# Patient Record
Sex: FEMALE | Race: WHITE | NOT HISPANIC OR LATINO | Employment: STUDENT | URBAN - NONMETROPOLITAN AREA
[De-identification: names, ages, dates, MRNs, and addresses within clinical notes are randomized per-mention and may not be internally consistent; named-entity substitution may affect disease eponyms.]

---
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
## 2022-05-25 ENCOUNTER — OFFICE VISIT (OUTPATIENT)
Dept: URGENT CARE | Facility: CLINIC | Age: 22
End: 2022-05-25
Payer: COMMERCIAL

## 2022-05-25 VITALS
HEART RATE: 80 BPM | SYSTOLIC BLOOD PRESSURE: 112 MMHG | DIASTOLIC BLOOD PRESSURE: 73 MMHG | TEMPERATURE: 98.4 F | OXYGEN SATURATION: 96 % | RESPIRATION RATE: 20 BRPM

## 2022-05-25 DIAGNOSIS — U07.1 COVID-19: Primary | ICD-10-CM

## 2022-05-25 LAB
SARS-COV-2 AG UPPER RESP QL IA: POSITIVE
VALID CONTROL: ABNORMAL

## 2022-05-25 PROCEDURE — 87811 SARS-COV-2 COVID19 W/OPTIC: CPT

## 2022-05-25 PROCEDURE — 99203 OFFICE O/P NEW LOW 30 MIN: CPT

## 2022-05-25 PROCEDURE — S9088 SERVICES PROVIDED IN URGENT: HCPCS

## 2022-05-25 RX ORDER — FLUTICASONE PROPIONATE 50 MCG
1 SPRAY, SUSPENSION (ML) NASAL DAILY
COMMUNITY

## 2022-05-25 NOTE — PATIENT INSTRUCTIONS
Rapid COVID test was positive  Please quarantine for 5 days from the onset of symptoms and upon the 6th day you may return to work a soft during a mask for the next 5 days  Current recommendations for treatment are 1 g of vitamin-C twice a day, 2000 International Units of vitamin D3 once a day, and multivitamin  Call your primary care provider to inform them of the positive COVID result and for follow-up  Currently there is a medication used to treat COVID for patient sore at risk of developing severe COVID you may discuss this with your primary care provider to see if you are eligible  Clean high touch surfaces after use including the bathroom  Practice proper cough etiquette and good hand hygiene  Dispose of used tissues immediately  May use an over-the-counter saline nasal spray, Mucinex as directed on the bottles for congestion as needed  May use hot showers to steam up bathroom and enter into the steaming room to help with congestion  May use Vicks in humidifier, or vapor rub, or drops in shower or tub to help with congestion  May use a cool mist or warm mist humidifier to help thin out secretions  May continue taking over-the-counter Tylenol and ibuprofen as directed on the bottle as needed for fever body aches  Ensure you are drinking plenty of fluids  Honey is a natural cough suppressant and will help soothe the sore throat  Honey should not be given to pregnant women or children under the age of 3years old  May also use over-the-counter cough drops or Cepacol as needed for sore throat  Follow with your PCP in 3-5 days  If symptoms worsen proceed to the ED

## 2022-05-25 NOTE — PROGRESS NOTES
[facility]        NAME: Tasneem Pineda is a 24 y o  female  : 2000    MRN: [id number]  DATE: May 25, 2022  TIME: 4:55 PM    Assessment and Plan   COVID-19 [U07 1]  1  COVID-19  Poct Covid 19 Rapid Antigen Test     Rapid COVID-19 was positive  Treat symptomatically  Patient educated on possible options and to call PCP  Patient Instructions   Rapid COVID test was positive  Please quarantine for 5 days from the onset of symptoms and upon the 6th day you may return to work a soft during a mask for the next 5 days  Current recommendations for treatment are 1 g of vitamin-C twice a day, 2000 International Units of vitamin D3 once a day, and multivitamin  Call your primary care provider to inform them of the positive COVID result and for follow-up  Currently there is a medication used to treat COVID for patient sore at risk of developing severe COVID you may discuss this with your primary care provider to see if you are eligible  Clean high touch surfaces after use including the bathroom  Practice proper cough etiquette and good hand hygiene  Dispose of used tissues immediately  May use an over-the-counter saline nasal spray, Mucinex as directed on the bottles for congestion as needed  May use hot showers to steam up bathroom and enter into the steaming room to help with congestion  May use Vicks in humidifier, or vapor rub, or drops in shower or tub to help with congestion  May use a cool mist or warm mist humidifier to help thin out secretions  May continue taking over-the-counter Tylenol and ibuprofen as directed on the bottle as needed for fever body aches  Ensure you are drinking plenty of fluids  Honey is a natural cough suppressant and will help soothe the sore throat  Honey should not be given to pregnant women or children under the age of 3years old  May also use over-the-counter cough drops or Cepacol as needed for sore throat  Follow with your PCP in 3-5 days  If symptoms worsen proceed to the ED  Follow up with PCP in 3-5 days  Proceed to  ER if symptoms worsen  Chief Complaint     Chief Complaint   Patient presents with    Cold Like Symptoms     Slight sore throat, fatigue, congestion since Saturday  Exposed to COVID         History of Present Illness       Patient is a 24year old female who presents to the office today for exposure to COVID-19  States that her roommate starting having symptoms on Friday night and related it to being in a smoke filled bar  States roommate tested positive for COVID-19 this morning  States that on Saturday night into Sunday patient started with congestion that got worse today  Also complains of fatigue, occasional cough-productive for light green/clear mucous, sneezing  Does suffer from seasonal allergies and believed it was that  Does currently smoke electronic cigarettes and medical marijuana  Denies nausea, vomiting, diarrhea,chest pain, sob  Review of Systems   Review of Systems   Constitutional: Negative for activity change, appetite change, chills, fatigue and fever  HENT: Positive for congestion, rhinorrhea, sinus pressure and sneezing  Respiratory: Positive for cough  Negative for shortness of breath and wheezing  Cardiovascular: Negative for chest pain and palpitations  Gastrointestinal: Negative for diarrhea, nausea and vomiting  Musculoskeletal: Negative for myalgias  Neurological: Negative for headaches  All other systems reviewed and are negative          Current Medications       Current Outpatient Medications:     Ascorbic Acid (VITAMIN C PO), Take by mouth, Disp: , Rfl:     Cetirizine HCl (ZYRTEC PO), Take by mouth, Disp: , Rfl:     fluticasone (FLONASE) 50 mcg/act nasal spray, 1 spray into each nostril daily, Disp: , Rfl:     IRON CR PO, Take by mouth, Disp: , Rfl:     Current Allergies     Allergies as of 05/25/2022    (No Known Allergies)            The following portions of the patient's history were reviewed and updated as appropriate: allergies, current medications, past family history, past medical history, past social history, past surgical history and problem list      Past Medical History:   Diagnosis Date    Foot pain, right     Wears contact lenses     Wears glasses        Past Surgical History:   Procedure Laterality Date    FOOT HARDWARE REMOVAL Right 12/8/2016    Procedure: 1ST METATARSAL HARDWARE REMOVAL; FUSION OF 1ST METATARSAL CUNEIFORM JOINT USE OF SCREWS AND PLATES ;  Surgeon: Inocencia Oconnor DPM;  Location: [facility];  Service:     NC CORRJ HALLUX VALGUS W/ANGELICAMDC W/1METAR MEDIAL CNF Right 12/8/2016    Procedure: REVISIONAL CORRECTION BUNION DEFORMITY;  Surgeon: Inocencia Oconnor DPM;  Location: [facility];  Service: Podiatry    NC [place] W/ANGELICAMDC W/DIST METAR OSTEOT Right 6/30/2016    Procedure: BUNION DEFORMITY REPAIR WITH BONE CUTS FIRST METATARSAL ,POSSIBLE GREAT TOE WITH PLATES,SCREWS, BONE GRAFT ;  Surgeon: Inocencia Oconnor DPM;  [facility];  Service: Podiatry    NC REMOVAL DEEP IMPLANT Right 4/27/2017    Procedure: REMOVAL OF PLATES AND SCREWS OF FOOT, STRESS IMAGING OF TARSOMETATARSAL JOINT WITH POSSIBLE PLACEMENT OF SCREWS, METALLIC OR ABSORBABLE OR SUTURE DEVICE AS NEEDED;  Surgeon: Inocencia Oconnor DPM;  Location[facility];  Service: Podiatry    TONSILECTOMY AND ADNOIDECTOMY      WISDOM TOOTH EXTRACTION         History reviewed  No pertinent family history  Medications have been verified  Objective   /73   Pulse 80   Temp 98 4 °F (36 9 °C)   Resp 20   SpO2 96%        Physical Exam     Physical Exam  Vitals and nursing note reviewed  Constitutional:       General: She is not in acute distress  Appearance: Normal appearance  She is normal weight  She is not ill-appearing or toxic-appearing     HENT:      Right Ear: Hearing, tympanic membrane, ear canal and external ear normal       Left Ear: Hearing, tympanic membrane, ear canal and external ear normal       Nose: Congestion present  Right Turbinates: Enlarged and swollen  Left Turbinates: Enlarged and swollen  Right Sinus: No maxillary sinus tenderness or frontal sinus tenderness  Left Sinus: No maxillary sinus tenderness or frontal sinus tenderness  Mouth/Throat:      Pharynx: Oropharynx is clear  Uvula midline  Cardiovascular:      Rate and Rhythm: Normal rate and regular rhythm  Pulses: Normal pulses  Heart sounds: Normal heart sounds  Pulmonary:      Effort: Pulmonary effort is normal  No respiratory distress  Breath sounds: Normal breath sounds  No stridor  No wheezing, rhonchi or rales  Chest:      Chest wall: No tenderness  Lymphadenopathy:      Cervical: No cervical adenopathy  Skin:     General: Skin is warm and dry  Capillary Refill: Capillary refill takes less than 2 seconds  Neurological:      General: No focal deficit present  Mental Status: She is alert

## 2022-05-25 NOTE — LETTER
[address] [address]  Dept: [phone]    May 25, 2022    Patient: Turner Mota  YOB: 2000    Turner Mota was seen and evaluated at our [facility]  If Covid or Flu test is positive, they may return to work on 5/28/2022, as this is 5 days from the onset of symptoms  Upon return, they must then adhere to strict masking for an additional 5 days      Sincerely,    [employer]

## 2022-08-11 ENCOUNTER — OFFICE VISIT (OUTPATIENT)
Dept: DENTISTRY | Facility: CLINIC | Age: 22
End: 2022-08-11

## 2022-08-11 VITALS — TEMPERATURE: 97.4 F | SYSTOLIC BLOOD PRESSURE: 112 MMHG | DIASTOLIC BLOOD PRESSURE: 72 MMHG

## 2022-08-11 DIAGNOSIS — K02.9 DENTAL CARIES: Primary | ICD-10-CM

## 2022-08-11 DIAGNOSIS — Z01.20 ENCOUNTER FOR DENTAL EXAMINATION: ICD-10-CM

## 2022-08-11 PROCEDURE — D0150 COMPREHENSIVE ORAL EVALUATION - NEW OR ESTABLISHED PATIENT: HCPCS | Performed by: DENTIST

## 2022-08-11 PROCEDURE — D0210 INTRAORAL - COMPLETE SERIES OF RADIOGRAPHIC IMAGES: HCPCS

## 2022-08-11 NOTE — PROGRESS NOTES
Reason for visit:Comprehensive Exam  Rooming Includes:  Dental Vitals recorded  Allergies Reviewed  Medication Reviewed  Dental Health Compliance: Twice daily brushing, never flossing, use of fluoride toothpaste  Medical History Reviewed  ASA 1 - Normal health patient  BP:112/72  Patient has complaints of intermittent pain upper left  Patient presents for exam appointment  Frankl + +  Treatment provided includes comprehensive exam performed by Dr Socorro Neville and full mouth series of xrays to evaluate overall health and decay  Full mouth perio charting recorded  Intraoral exam/Oral Cancer Screening presents with no significant findings  Next visit:prophy, restorative and referral to general dentist  *Triplicate form indicated today's procedures and future visits needed  First page is on file in media center and 3rd page was sent home with patient

## 2022-08-17 ENCOUNTER — OFFICE VISIT (OUTPATIENT)
Dept: DENTISTRY | Facility: CLINIC | Age: 22
End: 2022-08-17

## 2022-08-17 VITALS — SYSTOLIC BLOOD PRESSURE: 110 MMHG | DIASTOLIC BLOOD PRESSURE: 70 MMHG | TEMPERATURE: 98.6 F

## 2022-08-17 DIAGNOSIS — K02.9 DENTAL CARIES: Primary | ICD-10-CM

## 2022-08-17 PROCEDURE — D2391 RESIN-BASED COMPOSITE - 1 SURFACE, POSTERIOR: HCPCS | Performed by: DENTIST

## 2022-08-17 NOTE — PROGRESS NOTES
MUD consent form verified  No changes to medical history per MUD form  Pt is ASA 1 Patient reports pain level of 3   UL #14 was hurting but not at the moment  Patient denies any constitutional symptoms  Patient presents for restorative treatment # 18-O   EOE WNL  IOE shows no swelling or sinus tracts  Anesthesia: 20% benzocaine topical + 1 0 carpule of lidocaine 2% with 1:100k epi via IANB   Isolation: cotton roll  Tx:  Caries excavation of tooth #18-O  Decay deep, placed lime lite, Etch for 12 seconds with 37% phosphoric acid and rinsed, Ivoclar Adhese Universal bond placed with VivaPen 20 second scrub, air dried for 5 seconds and light cured and restored with Tetric composite  Polished restoration  Verified  occlusion  Patient satisfied and dismissed alert and ambulatory  Reviewed post-op anesthesia precautions with patient          NV: Restorative , clinic #[id number]

## 2022-08-18 ENCOUNTER — OFFICE VISIT (OUTPATIENT)
Dept: DENTISTRY | Facility: CLINIC | Age: 22
End: 2022-08-18

## 2022-08-18 VITALS — TEMPERATURE: 98 F | SYSTOLIC BLOOD PRESSURE: 120 MMHG | DIASTOLIC BLOOD PRESSURE: 68 MMHG

## 2022-08-18 DIAGNOSIS — K02.9 DENTAL CARIES: Primary | ICD-10-CM

## 2022-08-18 PROCEDURE — D2391 RESIN-BASED COMPOSITE - 1 SURFACE, POSTERIOR: HCPCS | Performed by: DENTIST

## 2022-08-18 PROCEDURE — D2392 RESIN-BASED COMPOSITE - 2 SURFACES, POSTERIOR: HCPCS | Performed by: DENTIST

## 2022-08-18 NOTE — PROGRESS NOTES
MUD consent form verified  No changes to medical history per MUD form  Pt is ASA 1  Patient reports pain level of 0  Patient denies any constitutional symptoms  Patient presents for restorative treatment # 30-OB,  31-O EOE WNL  IOE shows no swelling or sinus tracts  Anesthesia: 20% benzocaine topical +2 0 carpule of 2% lidocaine with 1:100k epi via IANB  Isolation: cotton rolls  Tx:  Caries excavation of tooth 30,31 Etch for 12 seconds with 37% phosphoric acid and rinsed, Ivoclar Adhese Universal bond placed with Madeira TherapeuticsaPen 20 second scrub, air dried for 5 seconds and light cured and restored with Tetric composite    Polished restoration  Verified occlusion  Patient satisfied and dismissed alert and ambulatory  Reviewed post-op anesthesia precautions with patient       NV: Restorative #3  Refer clinic RCT #[id number]

## 2022-08-19 ENCOUNTER — OFFICE VISIT (OUTPATIENT)
Dept: DENTISTRY | Facility: CLINIC | Age: 22
End: 2022-08-19

## 2022-08-19 VITALS — DIASTOLIC BLOOD PRESSURE: 68 MMHG | TEMPERATURE: 97.2 F | SYSTOLIC BLOOD PRESSURE: 118 MMHG

## 2022-08-19 DIAGNOSIS — Z29.8 ENCOUNTER FOR OTHER SPECIFIED PROPHYLACTIC MEASURES: Primary | ICD-10-CM

## 2022-08-19 DIAGNOSIS — K02.9 DENTAL CARIES: Primary | ICD-10-CM

## 2022-08-19 PROCEDURE — D1110 PROPHYLAXIS - ADULT: HCPCS

## 2022-08-19 PROCEDURE — D2392 RESIN-BASED COMPOSITE - 2 SURFACES, POSTERIOR: HCPCS | Performed by: DENTIST

## 2022-08-19 PROCEDURE — D1330 ORAL HYGIENE INSTRUCTIONS: HCPCS

## 2022-08-19 NOTE — PROGRESS NOTES
MUD consent form verified  No changes to medical history per MUD form  Pt is ASA 1  Patient reports pain level of 0  Patient denies any constitutional symptoms  Patient presents for restorative treatment # 3- OL   EOE WNL  IOE shows no swelling or sinus tracts  Anesthesia: 20% benzocaine topical + 1 0 carpule of 4% articaine with 1:100k epi via buccal infiltration  Isolation: cotton rolls  Tx:  Caries excavation of tooth  #3- decay deep in occlusal pits   Etch for 12 seconds with 37% phosphoric acid and rinsed, Ivoclar Adhese Universal bond placed with [email or web address] 20 second scrub, air dried for 5 seconds and light cured and restored with Tetric composite  Polished restoration  Verified  occlusion  Patient satisfied and dismissed alert and ambulatory    Reviewed post-op anesthesia precautions with patient      NV: refer clinic #14 RCT

## 2022-08-19 NOTE — PATIENT INSTRUCTIONS
Oral hygiene instructions include brushing 2x daily and flossing daily  Reviewed brushing along gumline

## 2022-08-19 NOTE — PROGRESS NOTES
Reason for visit:Routine Prophylaxis  Rooming Includes:  Dental Vitals recorded  Allergies Reviewed  Medication Reviewed  Dental Health Compliance: Twice daily brushing, never flossing, use of fluoride toothpaste  Medical History Reviewed  ASA 1 - Normal health patient    Patient has no complaints/no pain  Patient presents for hygiene appointment  Frankl + +  Treatment provided includes  adult prophy, handscale, polish(mint paste), floss,no routine xrays needed at this visit, oral hygiene instructions and nutritional counseling  Intraoral exam/Oral Cancer Screening presents with no significant findings  Plaque buildup is generalized Light  Calculus buildup is Localized  Light maxillary posterior and mandibular anterior teeth  Gingival evaluation is pink  Stain evaluation is no stain present  Oral hygiene instructions include brushing 2x daily and flossing daily  Next visit: referral to general dentist and 6 month recall  *Triplicate form indicated today's procedures and future visits needed   First page is on file in media center and 3rd page was sent home with patient

## 2024-03-14 ENCOUNTER — APPOINTMENT (OUTPATIENT)
Dept: PHYSICAL THERAPY | Facility: CLINIC | Age: 24
End: 2024-03-14

## 2024-03-14 PROCEDURE — 97530 THERAPEUTIC ACTIVITIES: CPT

## 2024-10-25 ENCOUNTER — APPOINTMENT (OUTPATIENT)
Dept: URGENT CARE | Facility: MEDICAL CENTER | Age: 24
End: 2024-10-25

## (undated) DEVICE — NEEDLE 25G X 1 1/2

## (undated) DEVICE — SUT ETHILON 4-0 PS-2 18 IN 1667H

## (undated) DEVICE — CAST PADDING 4 IN SYNTHETIC NON-STRL

## (undated) DEVICE — K-WIRE TROCAR POINT BOTH ENDS .045                                    X 4
Type: IMPLANTABLE DEVICE | Site: FOOT | Status: NON-FUNCTIONAL
Removed: 2017-04-27

## (undated) DEVICE — OCCLUSIVE GAUZE STRIP,3% BISMUTH TRIBROMOPHENATE IN PETROLATUM BLEND: Brand: XEROFORM

## (undated) DEVICE — NEEDLE 18 G X 1 1/2

## (undated) DEVICE — DRAPE C-ARM X-RAY

## (undated) DEVICE — CUFF TOURNIQUET 18 X 4 IN QUICK CONNECT DISP 1 BLADDER

## (undated) DEVICE — SUT VICRYL 2-0 CT-2 27 IN J269H

## (undated) DEVICE — STRETCH BANDAGE: Brand: CURITY

## (undated) DEVICE — ACE WRAP 4 IN UNSTERILE

## (undated) DEVICE — SUT VICRYL 3-0 SH 27 IN J416H

## (undated) DEVICE — INTENDED FOR TISSUE SEPARATION, AND OTHER PROCEDURES THAT REQUIRE A SHARP SURGICAL BLADE TO PUNCTURE OR CUT.: Brand: BARD-PARKER ® CARBON RIB-BACK BLADES

## (undated) DEVICE — PAD CAST 4 IN COTTON NON STERILE

## (undated) DEVICE — REM POLYHESIVE ADULT PATIENT RETURN ELECTRODE: Brand: VALLEYLAB

## (undated) DEVICE — GLOVE INDICATOR PI UNDERGLOVE SZ 8 BLUE

## (undated) DEVICE — SCD SEQUENTIAL COMPRESSION COMFORT SLEEVE MEDIUM KNEE LENGTH: Brand: KENDALL SCD

## (undated) DEVICE — SYRINGE 10ML LL

## (undated) DEVICE — 10FR FRAZIER SUCTION HANDLE: Brand: CARDINAL HEALTH

## (undated) DEVICE — UNIVERSAL  MINOR EXTREMITY PK: Brand: CARDINAL HEALTH

## (undated) DEVICE — CHLORAPREP HI-LITE 26ML ORANGE

## (undated) DEVICE — TUBING SUCTION 5MM X 12 FT

## (undated) DEVICE — 2000CC GUARDIAN II: Brand: GUARDIAN